# Patient Record
Sex: MALE | Race: WHITE
[De-identification: names, ages, dates, MRNs, and addresses within clinical notes are randomized per-mention and may not be internally consistent; named-entity substitution may affect disease eponyms.]

---

## 2020-10-21 ENCOUNTER — HOSPITAL ENCOUNTER (EMERGENCY)
Dept: HOSPITAL 94 - ER | Age: 45
Discharge: HOME | End: 2020-10-21
Payer: MEDICAID

## 2020-10-21 VITALS — SYSTOLIC BLOOD PRESSURE: 142 MMHG | DIASTOLIC BLOOD PRESSURE: 60 MMHG

## 2020-10-21 VITALS — HEIGHT: 68 IN | BODY MASS INDEX: 37.42 KG/M2 | WEIGHT: 246.92 LBS

## 2020-10-21 DIAGNOSIS — Z88.8: ICD-10-CM

## 2020-10-21 DIAGNOSIS — Z72.89: ICD-10-CM

## 2020-10-21 DIAGNOSIS — S22.048A: Primary | ICD-10-CM

## 2020-10-21 DIAGNOSIS — Z79.899: ICD-10-CM

## 2020-10-21 DIAGNOSIS — Z87.442: ICD-10-CM
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
## 2020-10-21 DIAGNOSIS — S16.1XXA: ICD-10-CM
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
## 2020-10-21 PROCEDURE — 72125 CT NECK SPINE W/O DYE: CPT

## 2020-10-21 PROCEDURE — 99285 EMERGENCY DEPT VISIT HI MDM: CPT

## 2020-10-21 PROCEDURE — 96372 THER/PROPH/DIAG INJ SC/IM: CPT

## 2020-10-21 PROCEDURE — 72128 CT CHEST SPINE W/O DYE: CPT

## 2022-05-25 PROCEDURE — 99283 EMERGENCY DEPT VISIT LOW MDM: CPT

## 2022-05-26 ENCOUNTER — APPOINTMENT (OUTPATIENT)
Dept: RADIOLOGY | Facility: MEDICAL CENTER | Age: 47
End: 2022-05-26
Attending: EMERGENCY MEDICINE
Payer: COMMERCIAL

## 2022-05-26 ENCOUNTER — HOSPITAL ENCOUNTER (EMERGENCY)
Facility: MEDICAL CENTER | Age: 47
End: 2022-05-26
Attending: EMERGENCY MEDICINE
Payer: COMMERCIAL

## 2022-05-26 VITALS
OXYGEN SATURATION: 94 % | HEIGHT: 71 IN | TEMPERATURE: 98.2 F | SYSTOLIC BLOOD PRESSURE: 128 MMHG | RESPIRATION RATE: 18 BRPM | BODY MASS INDEX: 36.4 KG/M2 | DIASTOLIC BLOOD PRESSURE: 70 MMHG | HEART RATE: 70 BPM | WEIGHT: 260 LBS

## 2022-05-26 DIAGNOSIS — S09.90XA CLOSED HEAD INJURY, INITIAL ENCOUNTER: ICD-10-CM

## 2022-05-26 DIAGNOSIS — G92.9 TOXIC ENCEPHALOPATHY: ICD-10-CM

## 2022-05-26 PROCEDURE — 304217 HCHG IRRIGATION SYSTEM

## 2022-05-26 PROCEDURE — 90471 IMMUNIZATION ADMIN: CPT

## 2022-05-26 PROCEDURE — 70450 CT HEAD/BRAIN W/O DYE: CPT

## 2022-05-26 PROCEDURE — 700111 HCHG RX REV CODE 636 W/ 250 OVERRIDE (IP): Performed by: EMERGENCY MEDICINE

## 2022-05-26 PROCEDURE — 90715 TDAP VACCINE 7 YRS/> IM: CPT | Performed by: EMERGENCY MEDICINE

## 2022-05-26 RX ADMIN — CLOSTRIDIUM TETANI TOXOID ANTIGEN (FORMALDEHYDE INACTIVATED), CORYNEBACTERIUM DIPHTHERIAE TOXOID ANTIGEN (FORMALDEHYDE INACTIVATED), BORDETELLA PERTUSSIS TOXOID ANTIGEN (GLUTARALDEHYDE INACTIVATED), BORDETELLA PERTUSSIS FILAMENTOUS HEMAGGLUTININ ANTIGEN (FORMALDEHYDE INACTIVATED), BORDETELLA PERTUSSIS PERTACTIN ANTIGEN, AND BORDETELLA PERTUSSIS FIMBRIAE 2/3 ANTIGEN 0.5 ML: 5; 2; 2.5; 5; 3; 5 INJECTION, SUSPENSION INTRAMUSCULAR at 03:09

## 2022-05-26 NOTE — DISCHARGE INSTRUCTIONS
Please discuss the following findings with your regular doctor:  CT-HEAD W/O   Final Result         1.  No acute intracranial abnormality.   2.  Right frontal and ethmoid sinusitis changes           Labs Reviewed - No data to display

## 2022-05-26 NOTE — ED PROVIDER NOTES
"ED Provider Note    Scribed for Ramón Pemberton M.D. by Apolinar Koch. 5/26/2022  1:55 AM    Primary care provider: No primary care provider noted.  Means of arrival: EMS  History obtained from: Patient  History limited by: Patient's alcohol intoxication    CHIEF COMPLAINT  Chief Complaint   Patient presents with   • ETOH Intoxication     Pt drank \"a lot\". Fell down and hit his head. -LOC, -thinners. +hematoma to right eyebrow. GCS 15.        HPI  Carlos Che is a 46 y.o. male who presents to the Emergency Department via EMS for evaluation of moderate altered mental status onset prior to arrival. The patient states that he had \"a lot\" to drink earlier tonight before experiencing a ground level fall and striking his head. Patient reports alcohol intoxication and head strike. He is unsure of any loss of consciousness. Patient denies any vomiting. No alleviating or exacerbating factors noted. He denies taking any blood thinners. Further history of present illness cannot be obtained due to the patient's alcohol intoxication    REVIEW OF SYSTEMS  Pertinent positives include: altered mental status, alcohol intoxication, and head strike. Pertinent negatives include: vomiting. See history of present illness. Further ROS cannot be obtained due to the patient's alcohol intoxication     PAST MEDICAL HISTORY       SURGICAL HISTORY  patient denies any surgical history    SOCIAL HISTORY      Social History     Substance and Sexual Activity   Drug Use None noted       FAMILY HISTORY  No family history pertinent.    CURRENT MEDICATIONS  Home Medications    **Home medications have not yet been reviewed for this encounter**         ALLERGIES  No Known Allergies    PHYSICAL EXAM  VITAL SIGNS: BP (!) 140/80   Pulse 92   Temp 36.6 °C (97.8 °F) (Temporal)   Resp 16   Ht 1.803 m (5' 11\")   Wt 118 kg (260 lb)   SpO2 98%   BMI 36.26 kg/m²     Constitutional: Alert in no apparent distress.  HENT: Ecchymosis above right eyebrow, " small laceration above right eyebrow, No C/T/L spine step-offs or deformities, No C/T/L spine tenderness to palpation. Bilateral external ears normal, Nose normal. Uvula midline.   Eyes: Pupils are equal and reactive, Conjunctiva normal, Non-icteric.   Neck: Normal range of motion, No tenderness, Supple, No stridor.   Lymphatic: No lymphadenopathy noted.   Cardiovascular: Regular rate and rhythm, no murmurs.   Thorax & Lungs: Normal breath sounds, No respiratory distress, No wheezing, No chest tenderness.   Abdomen:  Soft, No tenderness, No peritoneal signs, No masses, No pulsatile masses.   Skin: Warm, Dry, No erythema, No rash.   Back: No bony tenderness, No CVA tenderness.   Extremities: Intact distal pulses, No edema, No tenderness, No cyanosis.  Musculoskeletal: Good range of motion in all major joints. No tenderness to palpation or major deformities noted.   Neurologic: Alert , Normal motor function, Normal sensory function, No focal deficits noted.   Psychiatric: Affect normal, Judgment normal, Mood normal.     DIAGNOSTIC STUDIES / PROCEDURES    RADIOLOGY  CT-HEAD W/O   Final Result         1.  No acute intracranial abnormality.   2.  Right frontal and ethmoid sinusitis changes           The radiologist's interpretation of all radiological studies have been reviewed by me.    COURSE & MEDICAL DECISION MAKING  Nursing notes, VS, PMSFHx reviewed in chart.    46 y.o. male p/w chief complaint of altered mental status onset prior to arrival.    [place] head CT positive   Toxic encephalopathy likely given report of ingestion and behaviour in ED c/w intoxication  No trauma reported to suggest ICH or SDH as etiology  Despite labs not being drawn doubt significant electrolyte etiology given pt w/ ability to torerate PO and return to baseline.   Pt afebrile at this time, doubt infectious etiology of encephalopathy given resolution prior to d/c  Pt counseled on cessation and use in moderation  Pt ambulated w/ steady  gait w/o assistance and is tolerating PO prior to DC    1:55 AM Patient seen and examined at bedside. Ordered CT-Head W/O to further evaluate. Discussed utilizing imaging to rule out additional trauma, they are amenable to the plan of care.     4:29 AM - Nursing staff informed me that the patient is sober and appropriate. His head wound was irrigated and no sutures appear to be needed.    5:29 AM - Patient was reevaluated at bedside. Explained radiology results with the patient. Discussed plan for discharge; I advised the patient to follow-up with [facility] in 3 days, and to return to the [facility] ED with any new or worsening symptoms. Patient was given the opportunity for questions. I addressed all questions or concerns at this time and they verbalize agreement to the plan of care.      I verified that the patient was wearing a mask and I was wearing appropriate PPE every time I entered the room. The patient's mask was on the patient at all times during my encounter except for a brief view of the oropharynx.       The patient will return for new or worsening symptoms and is stable at the time of discharge.    The patient is referred to a primary physician for blood pressure management, diabetic screening, and for all other preventative health concerns.    DISPOSITION:  Patient will be discharged home in stable condition.    FOLLOW UP:  [facility], Emergency Dept  [address] [facility] [address] [phone]    If symptoms worsen    [facility]  [address] [facility] [address]  [phone]  In 3 days        OUTPATIENT MEDICATIONS:  There are no discharge medications for this patient.      FINAL IMPRESSION  1. Toxic encephalopathy    2. Closed head injury, initial encounter          Apolinar SANTILLAN (Mandy), am scribing for, and in the presence of, Ramón Pemberton M.D..    Electronically signed by: Apolinar Koch (Mandy), 5/26/2022    Ramón SANTILLAN M.D.  personally performed the services described in this documentation, as scribed by Apolinar Koch in my presence, and it is both accurate and complete.    The note accurately reflects work and decisions made by me.  Ramón Pemberton M.D.  5/26/2022  8:38 AM

## 2022-05-26 NOTE — ED NOTES
Pt CT done. Pt back in TCS and resting comfortably NAD. Pt asked if he could leave and explained the situation to him. Pt advised to notify this tech if anything w/ his condition changed.

## 2022-05-28 ENCOUNTER — HOSPITAL ENCOUNTER (EMERGENCY)
Dept: HOSPITAL 94 - ER | Age: 47
Discharge: HOME | End: 2022-05-28
Payer: MEDICAID

## 2022-05-28 VITALS — DIASTOLIC BLOOD PRESSURE: 80 MMHG | SYSTOLIC BLOOD PRESSURE: 136 MMHG

## 2022-05-28 VITALS — WEIGHT: 260.54 LBS | BODY MASS INDEX: 36.48 KG/M2 | HEIGHT: 71 IN

## 2022-05-28 DIAGNOSIS — S06.0X0A: Primary | ICD-10-CM
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
## 2022-05-28 DIAGNOSIS — Z79.899: ICD-10-CM

## 2022-05-28 DIAGNOSIS — Z87.442: ICD-10-CM

## 2022-05-28 DIAGNOSIS — Z88.8: ICD-10-CM
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
## 2022-05-28 DIAGNOSIS — Z88.6: ICD-10-CM
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
## 2022-05-28 PROCEDURE — 70450 CT HEAD/BRAIN W/O DYE: CPT

## 2022-05-28 PROCEDURE — 99284 EMERGENCY DEPT VISIT MOD MDM: CPT

## 2023-03-19 ENCOUNTER — HOSPITAL ENCOUNTER (EMERGENCY)
Dept: HOSPITAL 94 - ER | Age: 48
Discharge: HOME | End: 2023-03-19
Payer: MEDICAID

## 2023-03-19 VITALS — DIASTOLIC BLOOD PRESSURE: 112 MMHG | SYSTOLIC BLOOD PRESSURE: 160 MMHG

## 2023-03-19 VITALS — BODY MASS INDEX: 37.3 KG/M2 | HEIGHT: 70 IN | WEIGHT: 260.54 LBS

## 2023-03-19 DIAGNOSIS — J32.9: Primary | ICD-10-CM

## 2023-03-19 DIAGNOSIS — H92.02: ICD-10-CM

## 2023-03-19 DIAGNOSIS — Z79.82: ICD-10-CM

## 2023-03-19 DIAGNOSIS — Z79.899: ICD-10-CM

## 2023-03-19 DIAGNOSIS — Z87.442: ICD-10-CM

## 2023-03-19 DIAGNOSIS — Z88.8: ICD-10-CM

## 2023-03-19 PROCEDURE — 99283 EMERGENCY DEPT VISIT LOW MDM: CPT
